# Patient Record
Sex: FEMALE | Race: WHITE | NOT HISPANIC OR LATINO | URBAN - METROPOLITAN AREA
[De-identification: names, ages, dates, MRNs, and addresses within clinical notes are randomized per-mention and may not be internally consistent; named-entity substitution may affect disease eponyms.]

---

## 2018-03-15 ENCOUNTER — OUTPATIENT (OUTPATIENT)
Dept: OUTPATIENT SERVICES | Facility: HOSPITAL | Age: 48
LOS: 1 days | Discharge: HOME | End: 2018-03-15

## 2018-03-15 DIAGNOSIS — E55.9 VITAMIN D DEFICIENCY, UNSPECIFIED: ICD-10-CM

## 2018-03-15 DIAGNOSIS — E78.2 MIXED HYPERLIPIDEMIA: ICD-10-CM

## 2018-03-15 DIAGNOSIS — Z00.01 ENCOUNTER FOR GENERAL ADULT MEDICAL EXAMINATION WITH ABNORMAL FINDINGS: ICD-10-CM

## 2019-04-19 ENCOUNTER — TRANSCRIPTION ENCOUNTER (OUTPATIENT)
Age: 49
End: 2019-04-19

## 2019-11-07 ENCOUNTER — TRANSCRIPTION ENCOUNTER (OUTPATIENT)
Age: 49
End: 2019-11-07

## 2019-11-13 ENCOUNTER — OUTPATIENT (OUTPATIENT)
Dept: OUTPATIENT SERVICES | Facility: HOSPITAL | Age: 49
LOS: 1 days | Discharge: HOME | End: 2019-11-13

## 2019-11-13 DIAGNOSIS — Z00.01 ENCOUNTER FOR GENERAL ADULT MEDICAL EXAMINATION WITH ABNORMAL FINDINGS: ICD-10-CM

## 2019-11-13 DIAGNOSIS — E55.9 VITAMIN D DEFICIENCY, UNSPECIFIED: ICD-10-CM

## 2019-11-13 DIAGNOSIS — E78.00 PURE HYPERCHOLESTEROLEMIA, UNSPECIFIED: ICD-10-CM

## 2019-12-05 PROBLEM — Z00.00 ENCOUNTER FOR PREVENTIVE HEALTH EXAMINATION: Status: ACTIVE | Noted: 2019-12-05

## 2019-12-17 ENCOUNTER — APPOINTMENT (OUTPATIENT)
Dept: VASCULAR SURGERY | Facility: CLINIC | Age: 49
End: 2019-12-17
Payer: COMMERCIAL

## 2019-12-17 VITALS
SYSTOLIC BLOOD PRESSURE: 110 MMHG | HEIGHT: 64 IN | BODY MASS INDEX: 31.07 KG/M2 | WEIGHT: 182 LBS | DIASTOLIC BLOOD PRESSURE: 70 MMHG

## 2019-12-17 DIAGNOSIS — Z80.9 FAMILY HISTORY OF MALIGNANT NEOPLASM, UNSPECIFIED: ICD-10-CM

## 2019-12-17 DIAGNOSIS — I73.00 RAYNAUD'S SYNDROME W/OUT GANGRENE: ICD-10-CM

## 2019-12-17 DIAGNOSIS — Z83.3 FAMILY HISTORY OF DIABETES MELLITUS: ICD-10-CM

## 2019-12-17 DIAGNOSIS — Z82.49 FAMILY HISTORY OF ISCHEMIC HEART DISEASE AND OTHER DISEASES OF THE CIRCULATORY SYSTEM: ICD-10-CM

## 2019-12-17 DIAGNOSIS — M54.30 SCIATICA, UNSPECIFIED SIDE: ICD-10-CM

## 2019-12-17 DIAGNOSIS — J30.2 OTHER SEASONAL ALLERGIC RHINITIS: ICD-10-CM

## 2019-12-17 DIAGNOSIS — I70.213 ATHEROSCLEROSIS OF NATIVE ARTERIES OF EXTREMITIES WITH INTERMITTENT CLAUDICATION, BILATERAL LEGS: ICD-10-CM

## 2019-12-17 DIAGNOSIS — F17.200 NICOTINE DEPENDENCE, UNSPECIFIED, UNCOMPLICATED: ICD-10-CM

## 2019-12-17 DIAGNOSIS — J45.909 UNSPECIFIED ASTHMA, UNCOMPLICATED: ICD-10-CM

## 2019-12-17 PROCEDURE — 93925 LOWER EXTREMITY STUDY: CPT

## 2019-12-17 PROCEDURE — 99203 OFFICE O/P NEW LOW 30 MIN: CPT

## 2019-12-17 RX ORDER — GABAPENTIN 100 MG/1
CAPSULE ORAL
Refills: 0 | Status: ACTIVE | COMMUNITY

## 2019-12-17 RX ORDER — OMEPRAZOLE 20 MG/1
TABLET, DELAYED RELEASE ORAL
Refills: 0 | Status: ACTIVE | COMMUNITY

## 2019-12-17 RX ORDER — IBUPROFEN AND FAMOTIDINE 800; 26.6 MG/1; MG/1
TABLET, COATED ORAL
Refills: 0 | Status: ACTIVE | COMMUNITY

## 2019-12-17 RX ORDER — TIZANIDINE 4 MG/1
TABLET ORAL
Refills: 0 | Status: ACTIVE | COMMUNITY

## 2019-12-17 RX ORDER — LEVOCETIRIZINE DIHYDROCHLORIDE 5 MG/1
TABLET, FILM COATED ORAL
Refills: 0 | Status: ACTIVE | COMMUNITY

## 2019-12-17 RX ORDER — ALBUTEROL SULFATE 90 UG/1
INHALANT RESPIRATORY (INHALATION)
Refills: 0 | Status: ACTIVE | COMMUNITY

## 2019-12-17 NOTE — DATA REVIEWED
[FreeTextEntry1] : I performed an arterial duplex which was medically necessary to evaluate for arterial disease. It showed patent femoral, popliteal and tibial arteries.

## 2019-12-17 NOTE — ASSESSMENT
[FreeTextEntry1] : 48 y/o female presents for evaluation of PVD. She states that few weeks ago, she was working outside in the cold and when she came in she had numbness and blue discoloration to left second toe. Denies any claudication.\par I performed an arterial duplex which was medically necessary to evaluate for arterial disease. It showed patent femoral, popliteal and tibial arteries.\par I have informed her of the test results and explained to her that it is likely due to benign vasospastic disorder and no vascular surgical intervention is needed and she was advised follow up if her symptoms worsened.\par

## 2020-07-20 ENCOUNTER — TRANSCRIPTION ENCOUNTER (OUTPATIENT)
Age: 50
End: 2020-07-20

## 2020-10-25 ENCOUNTER — TRANSCRIPTION ENCOUNTER (OUTPATIENT)
Age: 50
End: 2020-10-25

## 2021-01-22 ENCOUNTER — TRANSCRIPTION ENCOUNTER (OUTPATIENT)
Age: 51
End: 2021-01-22

## 2021-05-26 ENCOUNTER — TRANSCRIPTION ENCOUNTER (OUTPATIENT)
Age: 51
End: 2021-05-26

## 2021-12-22 ENCOUNTER — TRANSCRIPTION ENCOUNTER (OUTPATIENT)
Age: 51
End: 2021-12-22

## 2023-01-02 ENCOUNTER — NON-APPOINTMENT (OUTPATIENT)
Age: 53
End: 2023-01-02

## 2023-02-05 NOTE — HISTORY OF PRESENT ILLNESS
Patient calling.    Plans on going into rehab this Tuesday for 4 days of detox. Hoping to speak with PCP to see if could be prescribed a medication for anxiety that he can take prior to going in. Patient requesting I send message to care team as clinic is closed today.    Patient call back number: [phone]    Rosalia He RN on 2/5/2023 at 1:00 PM       Reason for Disposition    Prescription request for new medicine (not a refill)    Protocols used: MEDICATION QUESTION CALL-A-       [FreeTextEntry1] : 48 y/o female presents for evaluation of PVD. She states that few weeks ago, she was working outside in the cold and when she came in she had numbness and blue discoloration to left second toe. Denies any claudication.

## 2023-07-01 ENCOUNTER — NON-APPOINTMENT (OUTPATIENT)
Age: 53
End: 2023-07-01

## 2023-07-03 ENCOUNTER — APPOINTMENT (OUTPATIENT)
Dept: ORTHOPEDIC SURGERY | Facility: CLINIC | Age: 53
End: 2023-07-03
Payer: OTHER GOVERNMENT

## 2023-07-03 DIAGNOSIS — S99.911A UNSPECIFIED INJURY OF RIGHT ANKLE, INITIAL ENCOUNTER: ICD-10-CM

## 2023-07-03 DIAGNOSIS — S89.90XA UNSPECIFIED INJURY OF UNSPECIFIED LOWER LEG, INITIAL ENCOUNTER: ICD-10-CM

## 2023-07-03 PROCEDURE — 73610 X-RAY EXAM OF ANKLE: CPT | Mod: RT

## 2023-07-03 PROCEDURE — 99203 OFFICE O/P NEW LOW 30 MIN: CPT | Mod: ACP

## 2023-07-03 PROCEDURE — 73562 X-RAY EXAM OF KNEE 3: CPT | Mod: RT

## 2023-07-03 PROCEDURE — 73590 X-RAY EXAM OF LOWER LEG: CPT | Mod: RT

## 2023-07-03 NOTE — DATA REVIEWED
[FreeTextEntry1] : Stray right knee: No acute fractures, subluxations, dislocations. [FreeTextEntry2] : X-ray of right ankle: No acute fractures, subluxations, dislocations. [FreeTextEntry3] : X-ray of right tib-fib: No acute fractures, subluxations, dislocations.

## 2023-07-03 NOTE — PHYSICAL EXAM
[Negative] : negative posterior draw [Right] : right foot and ankle [Mild] : mild diffused ankle swelling [] : ambulation with crutches [FreeTextEntry3] : Minimal ecchymosis of calf [FreeTextEntry8] : No tenderness to ankle.  Minimal tenderness over area of proximal Achilles/calf [FreeTextEntry9] : Mild decreased range of motion of ankle secondary to pain [de-identified] : Mild decrease strength secondary to pain

## 2023-07-03 NOTE — HISTORY OF PRESENT ILLNESS
[de-identified] : Patient is a 52-year-old female here for evaluation of right knee/calf/ankle.  Patient states on 7/2/2023 she was at work.  Patient works for [employer].  Patient states that she was moving a large box and felt a pop in her right calf area.  Patient states she was immediate pain and unable to bear weight.  Patient went to urgent care, no x-rays were taken.  Patient was given crutches and told to follow-up orthopedics.  Patient states at this point most of her pain is in the calf area.  Patient states when she tries to ambulate she has minimal pain to the right knee/ankle area that seems to be radiating from the calf.  Denies numbness/tingling.

## 2023-07-24 ENCOUNTER — NON-APPOINTMENT (OUTPATIENT)
Age: 53
End: 2023-07-24

## 2023-07-24 ENCOUNTER — APPOINTMENT (OUTPATIENT)
Dept: ORTHOPEDIC SURGERY | Facility: CLINIC | Age: 53
End: 2023-07-24
Payer: OTHER GOVERNMENT

## 2023-07-24 PROCEDURE — L3170: CPT | Mod: RT

## 2023-07-24 PROCEDURE — 99214 OFFICE O/P EST MOD 30 MIN: CPT

## 2023-07-24 NOTE — HISTORY OF PRESENT ILLNESS
[de-identified] : 52-year-old patient who presents with a right Achilles injury.  She sustained this injury at work approximately 3 weeks ago.  She was seen by one of our physician assistants who placed her into a boot and ordered an MRI.  Currently her pain is well controlled.  Denies any calf pain chest pain shortness of breath

## 2023-07-24 NOTE — PHYSICAL EXAM
[de-identified] : She is tender palpation at the musculotendinous junction of the Achilles alone.  There is no gap.  There is normal Avila's test.  Grossly intact range of motion.  Negative Homans' sign.  Neurovascular intact

## 2023-07-24 NOTE — DISCUSSION/SUMMARY
[de-identified] : Lee the patient's findings with her.  At this time she should maintain the boot for another couple weeks.  I will see her back in 3 weeks for repeat clinical exam.  I did provide her with a heel wedge to offload the Achilles.  I will follow-up with the results of the MRI.  If they change her management I will give her a call.  All questions answered.

## 2023-07-24 NOTE — DATA REVIEWED
[FreeTextEntry1] : Reviewed the imaging from her last exam which are negative for fracture dislocation.  She had the MRI done but does not have either the report or the images as this was recently done.

## 2023-07-26 ENCOUNTER — TRANSCRIPTION ENCOUNTER (OUTPATIENT)
Age: 53
End: 2023-07-26

## 2023-08-14 ENCOUNTER — APPOINTMENT (OUTPATIENT)
Dept: ORTHOPEDIC SURGERY | Facility: CLINIC | Age: 53
End: 2023-08-14
Payer: OTHER GOVERNMENT

## 2023-08-14 PROCEDURE — 99213 OFFICE O/P EST LOW 20 MIN: CPT

## 2023-08-15 NOTE — DISCUSSION/SUMMARY
[de-identified] : Pt has not yet had physical therapy secondary to  approval. I think this will be critical to her recovery. She can wean off the boot. I will see her back in 1 month for repeat clinical exam. She remains 100% temporarily disabled.

## 2023-08-15 NOTE — HISTORY OF PRESENT ILLNESS
[de-identified] : Pt presents for followup of her right Achilles. Wearing the boot as directed. Still with pain even with the boot on, localized to the musculotendinous junction of the tendon. Denies interval trauma.

## 2023-08-15 NOTE — PHYSICAL EXAM
[de-identified] : Skin intact. Tender at musculotendinous junction. Pain with passive ROM of the ankle. Achilles intact with normal [place]. Neurovascularly intact distally.

## 2023-09-15 ENCOUNTER — APPOINTMENT (OUTPATIENT)
Dept: ORTHOPEDIC SURGERY | Facility: CLINIC | Age: 53
End: 2023-09-15
Payer: OTHER GOVERNMENT

## 2023-09-15 PROCEDURE — 99213 OFFICE O/P EST LOW 20 MIN: CPT

## 2023-10-16 ENCOUNTER — NON-APPOINTMENT (OUTPATIENT)
Age: 53
End: 2023-10-16

## 2023-10-16 ENCOUNTER — APPOINTMENT (OUTPATIENT)
Dept: ORTHOPEDIC SURGERY | Facility: CLINIC | Age: 53
End: 2023-10-16
Payer: OTHER GOVERNMENT

## 2023-10-16 PROCEDURE — 99213 OFFICE O/P EST LOW 20 MIN: CPT

## 2023-11-17 ENCOUNTER — APPOINTMENT (OUTPATIENT)
Dept: ORTHOPEDIC SURGERY | Facility: CLINIC | Age: 53
End: 2023-11-17
Payer: OTHER GOVERNMENT

## 2023-11-17 ENCOUNTER — NON-APPOINTMENT (OUTPATIENT)
Age: 53
End: 2023-11-17

## 2023-11-17 DIAGNOSIS — S86.011S STRAIN OF RIGHT ACHILLES TENDON, SEQUELA: ICD-10-CM

## 2023-11-17 PROCEDURE — 99213 OFFICE O/P EST LOW 20 MIN: CPT
